# Patient Record
Sex: FEMALE | Race: BLACK OR AFRICAN AMERICAN | NOT HISPANIC OR LATINO | Employment: PART TIME | URBAN - METROPOLITAN AREA
[De-identification: names, ages, dates, MRNs, and addresses within clinical notes are randomized per-mention and may not be internally consistent; named-entity substitution may affect disease eponyms.]

---
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
## 2019-06-20 ENCOUNTER — OFFICE VISIT (OUTPATIENT)
Dept: URGENT CARE | Age: 19
End: 2019-06-20
Payer: COMMERCIAL

## 2019-06-20 VITALS
RESPIRATION RATE: 20 BRPM | DIASTOLIC BLOOD PRESSURE: 71 MMHG | HEART RATE: 62 BPM | OXYGEN SATURATION: 97 % | TEMPERATURE: 98.1 F | HEIGHT: 66 IN | SYSTOLIC BLOOD PRESSURE: 122 MMHG | WEIGHT: 156.2 LBS | BODY MASS INDEX: 25.1 KG/M2

## 2019-06-20 DIAGNOSIS — L30.9 DERMATITIS: Primary | ICD-10-CM

## 2019-06-20 PROCEDURE — G0382 LEV 3 HOSP TYPE B ED VISIT: HCPCS | Performed by: FAMILY MEDICINE

## 2019-06-20 PROCEDURE — 99283 EMERGENCY DEPT VISIT LOW MDM: CPT | Performed by: FAMILY MEDICINE

## 2019-06-20 PROCEDURE — 99203 OFFICE O/P NEW LOW 30 MIN: CPT | Performed by: FAMILY MEDICINE

## 2019-06-25 ENCOUNTER — OFFICE VISIT (OUTPATIENT)
Dept: FAMILY MEDICINE CLINIC | Facility: CLINIC | Age: 19
End: 2019-06-25
Payer: COMMERCIAL

## 2019-06-25 VITALS
BODY MASS INDEX: 24.94 KG/M2 | DIASTOLIC BLOOD PRESSURE: 70 MMHG | RESPIRATION RATE: 16 BRPM | HEIGHT: 66 IN | SYSTOLIC BLOOD PRESSURE: 120 MMHG | WEIGHT: 155.2 LBS | TEMPERATURE: 97.9 F | OXYGEN SATURATION: 97 % | HEART RATE: 76 BPM

## 2019-06-25 DIAGNOSIS — L42 PITYRIASIS ROSEA: Primary | ICD-10-CM

## 2019-06-25 PROBLEM — O35.8XX0 FETAL CARDIAC ANOMALY AFFECTING PREGNANCY, ANTEPARTUM: Status: RESOLVED | Noted: 2018-06-14 | Resolved: 2019-06-25

## 2019-06-25 PROBLEM — N92.0 MENORRHAGIA WITH REGULAR CYCLE: Status: RESOLVED | Noted: 2018-08-06 | Resolved: 2019-06-25

## 2019-06-25 PROBLEM — O99.011 ANEMIA AFFECTING PREGNANCY IN FIRST TRIMESTER: Status: RESOLVED | Noted: 2018-03-19 | Resolved: 2019-06-25

## 2019-06-25 PROBLEM — Z3A.39 39 WEEKS GESTATION OF PREGNANCY: Status: RESOLVED | Noted: 2018-10-02 | Resolved: 2019-06-25

## 2019-06-25 PROCEDURE — 3008F BODY MASS INDEX DOCD: CPT | Performed by: PHYSICIAN ASSISTANT

## 2019-06-25 PROCEDURE — 1036F TOBACCO NON-USER: CPT | Performed by: PHYSICIAN ASSISTANT

## 2019-06-25 PROCEDURE — 99203 OFFICE O/P NEW LOW 30 MIN: CPT | Performed by: PHYSICIAN ASSISTANT

## 2020-02-06 ENCOUNTER — TELEPHONE (OUTPATIENT)
Dept: FAMILY MEDICINE CLINIC | Facility: CLINIC | Age: 20
End: 2020-02-06

## 2020-02-06 NOTE — TELEPHONE ENCOUNTER
----- Message from Bienvenido Harris sent at 2/6/2020  9:55 AM EST -----  Regarding: schedule pt  Please call and schedule PE

## 2020-02-06 NOTE — TELEPHONE ENCOUNTER
----- Message from Knomo sent at 2/6/2020  9:55 AM EST -----  Regarding: schedule pt  Please call and schedule PE

## 2020-02-06 NOTE — TELEPHONE ENCOUNTER
----- Message from Megan Mota sent at 2/6/2020  9:55 AM EST -----  Regarding: schedule pt  Please call and schedule PE

## 2020-02-06 NOTE — TELEPHONE ENCOUNTER
Pt had a physical scheduled in Sept but was a no show   Called and left message to cb to schedule physical

## 2020-09-23 ENCOUNTER — TELEPHONE (OUTPATIENT)
Dept: FAMILY MEDICINE CLINIC | Facility: CLINIC | Age: 20
End: 2020-09-23

## 2021-02-15 ENCOUNTER — LAB REQUISITION (OUTPATIENT)
Dept: LAB | Facility: HOSPITAL | Age: 21
End: 2021-02-15

## 2021-02-15 DIAGNOSIS — Z11.59 ENCOUNTER FOR SCREENING FOR OTHER VIRAL DISEASES: ICD-10-CM

## 2021-02-15 PROCEDURE — 87635 SARS-COV-2 COVID-19 AMP PRB: CPT | Performed by: FAMILY MEDICINE

## 2021-02-16 LAB — SARS-COV-2 RNA RESP QL NAA+PROBE: NEGATIVE

## 2021-03-01 ENCOUNTER — TELEPHONE (OUTPATIENT)
Dept: FAMILY MEDICINE CLINIC | Facility: CLINIC | Age: 21
End: 2021-03-01

## 2021-06-22 ENCOUNTER — HOSPITAL ENCOUNTER (EMERGENCY)
Facility: HOSPITAL | Age: 21
Discharge: HOME | End: 2021-06-23
Attending: EMERGENCY MEDICINE
Payer: COMMERCIAL

## 2021-06-22 DIAGNOSIS — E87.1 HYPONATREMIA: ICD-10-CM

## 2021-06-22 DIAGNOSIS — R51.9 HEADACHE: Primary | ICD-10-CM

## 2021-06-22 LAB
BASOPHILS # BLD AUTO: 0.02 THOUSANDS/ΜL (ref 0–0.1)
BASOPHILS NFR BLD AUTO: 0 % (ref 0–1)
BILIRUB UR QL STRIP: NEGATIVE
CLARITY UR: ABNORMAL
COLOR UR: YELLOW
EOSINOPHIL # BLD AUTO: 0.05 THOUSAND/ΜL (ref 0–0.61)
EOSINOPHIL NFR BLD AUTO: 1 % (ref 0–6)
ERYTHROCYTE [DISTWIDTH] IN BLOOD BY AUTOMATED COUNT: 12.7 % (ref 11.6–15.1)
EXT PREG TEST URINE: NEGATIVE
EXT. CONTROL ED NAV: NORMAL
GLUCOSE UR STRIP-MCNC: NEGATIVE MG/DL
HCT VFR BLD AUTO: 38 % (ref 34.8–46.1)
HGB BLD-MCNC: 12.5 G/DL (ref 11.5–15.4)
HGB UR QL STRIP.AUTO: ABNORMAL
IMM GRANULOCYTES # BLD AUTO: 0.02 THOUSAND/UL (ref 0–0.2)
IMM GRANULOCYTES NFR BLD AUTO: 0 % (ref 0–2)
KETONES UR STRIP-MCNC: NEGATIVE MG/DL
LEUKOCYTE ESTERASE UR QL STRIP: ABNORMAL
LYMPHOCYTES # BLD AUTO: 3.11 THOUSANDS/ΜL (ref 0.6–4.47)
LYMPHOCYTES NFR BLD AUTO: 53 % (ref 14–44)
MCH RBC QN AUTO: 29.8 PG (ref 26.8–34.3)
MCHC RBC AUTO-ENTMCNC: 32.9 G/DL (ref 31.4–37.4)
MCV RBC AUTO: 91 FL (ref 82–98)
MONOCYTES # BLD AUTO: 0.5 THOUSAND/ΜL (ref 0.17–1.22)
MONOCYTES NFR BLD AUTO: 8 % (ref 4–12)
NEUTROPHILS # BLD AUTO: 2.22 THOUSANDS/ΜL (ref 1.85–7.62)
NEUTS SEG NFR BLD AUTO: 38 % (ref 43–75)
NITRITE UR QL STRIP: NEGATIVE
NRBC BLD AUTO-RTO: 0 /100 WBCS
PH UR STRIP.AUTO: 7.5 [PH] (ref 4.5–8)
PLATELET # BLD AUTO: 204 THOUSANDS/UL (ref 149–390)
PMV BLD AUTO: 9.2 FL (ref 8.9–12.7)
PROT UR STRIP-MCNC: NEGATIVE MG/DL
RBC # BLD AUTO: 4.2 MILLION/UL (ref 3.81–5.12)
SP GR UR STRIP.AUTO: 1.02 (ref 1–1.03)
UROBILINOGEN UR QL STRIP.AUTO: 2 E.U./DL
WBC # BLD AUTO: 5.92 THOUSAND/UL (ref 4.31–10.16)

## 2021-06-22 PROCEDURE — 99284 EMERGENCY DEPT VISIT MOD MDM: CPT | Performed by: EMERGENCY MEDICINE

## 2021-06-22 PROCEDURE — 80048 BASIC METABOLIC PNL TOTAL CA: CPT | Performed by: EMERGENCY MEDICINE

## 2021-06-22 PROCEDURE — 80076 HEPATIC FUNCTION PANEL: CPT | Performed by: EMERGENCY MEDICINE

## 2021-06-22 PROCEDURE — 36415 COLL VENOUS BLD VENIPUNCTURE: CPT | Performed by: EMERGENCY MEDICINE

## 2021-06-22 PROCEDURE — 93005 ELECTROCARDIOGRAM TRACING: CPT

## 2021-06-22 PROCEDURE — 83735 ASSAY OF MAGNESIUM: CPT | Performed by: EMERGENCY MEDICINE

## 2021-06-22 PROCEDURE — 96361 HYDRATE IV INFUSION ADD-ON: CPT

## 2021-06-22 PROCEDURE — 81025 URINE PREGNANCY TEST: CPT | Performed by: EMERGENCY MEDICINE

## 2021-06-22 PROCEDURE — 96375 TX/PRO/DX INJ NEW DRUG ADDON: CPT

## 2021-06-22 PROCEDURE — 85025 COMPLETE CBC W/AUTO DIFF WBC: CPT | Performed by: EMERGENCY MEDICINE

## 2021-06-22 PROCEDURE — 96374 THER/PROPH/DIAG INJ IV PUSH: CPT

## 2021-06-22 PROCEDURE — 99284 EMERGENCY DEPT VISIT MOD MDM: CPT

## 2021-06-22 PROCEDURE — 84443 ASSAY THYROID STIM HORMONE: CPT | Performed by: EMERGENCY MEDICINE

## 2021-06-22 PROCEDURE — 81001 URINALYSIS AUTO W/SCOPE: CPT

## 2021-06-22 RX ORDER — DIPHENHYDRAMINE HYDROCHLORIDE 50 MG/ML
25 INJECTION INTRAMUSCULAR; INTRAVENOUS ONCE
Status: DISCONTINUED | OUTPATIENT
Start: 2021-06-22 | End: 2021-06-23 | Stop reason: HOSPADM

## 2021-06-22 RX ORDER — METOCLOPRAMIDE HYDROCHLORIDE 5 MG/ML
10 INJECTION INTRAMUSCULAR; INTRAVENOUS ONCE
Status: COMPLETED | OUTPATIENT
Start: 2021-06-22 | End: 2021-06-22

## 2021-06-22 RX ORDER — KETOROLAC TROMETHAMINE 30 MG/ML
15 INJECTION, SOLUTION INTRAMUSCULAR; INTRAVENOUS ONCE
Status: COMPLETED | OUTPATIENT
Start: 2021-06-22 | End: 2021-06-22

## 2021-06-22 RX ADMIN — SODIUM CHLORIDE 1000 ML: 0.9 INJECTION, SOLUTION INTRAVENOUS at 23:21

## 2021-06-22 RX ADMIN — METOCLOPRAMIDE 10 MG: 5 INJECTION, SOLUTION INTRAMUSCULAR; INTRAVENOUS at 23:24

## 2021-06-22 RX ADMIN — KETOROLAC TROMETHAMINE 15 MG: 30 INJECTION, SOLUTION INTRAMUSCULAR; INTRAVENOUS at 23:24

## 2021-06-23 VITALS
OXYGEN SATURATION: 100 % | RESPIRATION RATE: 16 BRPM | DIASTOLIC BLOOD PRESSURE: 72 MMHG | TEMPERATURE: 98.1 F | SYSTOLIC BLOOD PRESSURE: 110 MMHG | HEART RATE: 76 BPM

## 2021-06-23 LAB
ALBUMIN SERPL BCP-MCNC: 4 G/DL (ref 3.5–5)
ALP SERPL-CCNC: 52 U/L (ref 46–116)
ALT SERPL W P-5'-P-CCNC: 19 U/L (ref 12–78)
AMORPH URATE CRY URNS QL MICRO: ABNORMAL /HPF
ANION GAP SERPL CALCULATED.3IONS-SCNC: -1 MMOL/L (ref 4–13)
AST SERPL W P-5'-P-CCNC: 18 U/L (ref 5–45)
ATRIAL RATE: 67 BPM
BACTERIA UR QL AUTO: ABNORMAL /HPF
BILIRUB DIRECT SERPL-MCNC: 0.08 MG/DL (ref 0–0.2)
BILIRUB SERPL-MCNC: 0.21 MG/DL (ref 0.2–1)
BUN SERPL-MCNC: 18 MG/DL (ref 5–25)
CALCIUM SERPL-MCNC: 9.4 MG/DL (ref 8.3–10.1)
CHLORIDE SERPL-SCNC: 101 MMOL/L (ref 100–108)
CO2 SERPL-SCNC: 26 MMOL/L (ref 21–32)
CREAT SERPL-MCNC: 0.74 MG/DL (ref 0.6–1.3)
GFR SERPL CREATININE-BSD FRML MDRD: 134 ML/MIN/1.73SQ M
GLUCOSE SERPL-MCNC: 88 MG/DL (ref 65–140)
MAGNESIUM SERPL-MCNC: 2 MG/DL (ref 1.6–2.6)
NON-SQ EPI CELLS URNS QL MICRO: ABNORMAL /HPF
P AXIS: 61 DEGREES
POTASSIUM SERPL-SCNC: 3.4 MMOL/L (ref 3.5–5.3)
PR INTERVAL: 148 MS
PROT SERPL-MCNC: 8 G/DL (ref 6.4–8.2)
QRS AXIS: 78 DEGREES
QRSD INTERVAL: 96 MS
QT INTERVAL: 380 MS
QTC INTERVAL: 401 MS
RBC #/AREA URNS AUTO: ABNORMAL /HPF
SODIUM SERPL-SCNC: 126 MMOL/L (ref 136–145)
T WAVE AXIS: 54 DEGREES
TSH SERPL DL<=0.05 MIU/L-ACNC: 3.62 UIU/ML (ref 0.36–3.74)
VENTRICULAR RATE: 67 BPM
WBC #/AREA URNS AUTO: ABNORMAL /HPF

## 2021-06-23 PROCEDURE — 93010 ELECTROCARDIOGRAM REPORT: CPT

## 2021-06-23 RX ORDER — SODIUM CHLORIDE 1000 MG
1 TABLET, SOLUBLE MISCELLANEOUS 3 TIMES DAILY
Qty: 30 TABLET | Refills: 0 | Status: SHIPPED | OUTPATIENT
Start: 2021-06-23 | End: 2021-07-03

## 2021-06-23 RX ORDER — METOCLOPRAMIDE 10 MG/1
10 TABLET ORAL EVERY 6 HOURS
Qty: 30 TABLET | Refills: 0 | Status: SHIPPED | OUTPATIENT
Start: 2021-06-23

## 2021-06-23 RX ORDER — NAPROXEN 500 MG/1
500 TABLET ORAL 2 TIMES DAILY WITH MEALS
Qty: 20 TABLET | Refills: 0 | Status: SHIPPED | OUTPATIENT
Start: 2021-06-23 | End: 2021-07-03

## 2021-06-23 NOTE — ED PROVIDER NOTES
History  Chief Complaint   Patient presents with    Headache     Pt reports headache for one week and bp of 168/120 at home per pt       22 YO female presents with headache for the last week  Pt states her discomfort is in the frontal head, constant over the last week, waxing and waning, it becomes sharp with bright lights, loud noises  Pt has had associated nausea for the last few days  She does not recall how the pain started  Pt has been taking ASA, NSAIDs without alleviation of the pain  Pt does have a Hx of infrequent headaches, states location and dull quality of pain are similar but her previous headaches did not last as long and would not become sharp  She had her blood pressure taken today and found this to be 168/120  Pt denies CP/SOB/F/C/D/C, no dysuria, burning on urination or blood in urine  History provided by:  Patient   used: No    Headache  Pain location:  Frontal  Quality:  Dull  Radiates to:  Does not radiate  Severity currently:  7/10  Severity at highest:  7/10  Onset quality:  Gradual  Duration:  1 week  Timing:  Constant  Progression:  Waxing and waning  Chronicity:  New  Similar to prior headaches: yes    Context: bright light    Relieved by:  Nothing  Worsened by:  Light and sound  Ineffective treatments:  Aspirin and NSAIDs  Associated symptoms: nausea    Associated symptoms: no abdominal pain, no diarrhea, no dizziness, no fatigue, no fever, no vomiting and no weakness        Prior to Admission Medications   Prescriptions Last Dose Informant Patient Reported? Taking?   hydrocortisone 2 5 % cream   No No   Sig: Apply topically 3 (three) times a day      Facility-Administered Medications: None       Past Medical History:   Diagnosis Date    Anemia affecting pregnancy in first trimester 3/19/2018    Varicella     Pt is unsure        History reviewed  No pertinent surgical history      Family History   Problem Relation Age of Onset    Hypertension Mother    Nicki Snell Asthma Brother     Hypertension Maternal Grandmother     Alcohol abuse Maternal Grandfather     Hypertension Maternal Aunt      I have reviewed and agree with the history as documented  E-Cigarette/Vaping     E-Cigarette/Vaping Substances     Social History     Tobacco Use    Smoking status: Never Smoker    Smokeless tobacco: Never Used   Substance Use Topics    Alcohol use: No    Drug use: No       Review of Systems   Constitutional: Negative for chills, fatigue and fever  HENT: Negative for dental problem  Eyes: Negative for visual disturbance  Respiratory: Negative for shortness of breath  Cardiovascular: Negative for chest pain  Gastrointestinal: Positive for nausea  Negative for abdominal pain, diarrhea and vomiting  Genitourinary: Negative for dysuria and frequency  Musculoskeletal: Negative for arthralgias  Skin: Negative for rash  Neurological: Positive for headaches  Negative for dizziness, weakness and light-headedness  Psychiatric/Behavioral: Negative for agitation, behavioral problems and confusion  All other systems reviewed and are negative  Physical Exam  Physical Exam  Vitals and nursing note reviewed  Constitutional:       Appearance: Normal appearance  HENT:      Head: Normocephalic and atraumatic  Eyes:      Extraocular Movements: Extraocular movements intact  Conjunctiva/sclera: Conjunctivae normal       Pupils: Pupils are equal, round, and reactive to light  Cardiovascular:      Rate and Rhythm: Normal rate  Pulmonary:      Effort: Pulmonary effort is normal    Abdominal:      General: There is no distension  Musculoskeletal:         General: Normal range of motion  Cervical back: Normal range of motion and neck supple  No rigidity  Skin:     Findings: No rash  Neurological:      General: No focal deficit present  Mental Status: She is alert  Cranial Nerves: No cranial nerve deficit     Psychiatric:         Mood and Affect: Mood normal          Vital Signs  ED Triage Vitals   Temperature Pulse Respirations Blood Pressure SpO2   06/22/21 2235 06/22/21 2235 06/22/21 2235 06/22/21 2235 06/22/21 2235   98 1 °F (36 7 °C) 77 16 164/91 100 %      Temp Source Heart Rate Source Patient Position - Orthostatic VS BP Location FiO2 (%)   06/22/21 2235 06/22/21 2235 06/23/21 0045 06/22/21 2235 --   Oral Monitor Sitting Right arm       Pain Score       06/22/21 2324       7           Vitals:    06/22/21 2235 06/23/21 0013 06/23/21 0045   BP: 164/91 106/72 110/72   Pulse: 77 73 76   Patient Position - Orthostatic VS:   Sitting         Visual Acuity      ED Medications  Medications   diphenhydrAMINE (BENADRYL) injection 25 mg (25 mg Intravenous Not Given 6/22/21 2328)   sodium chloride 0 9 % bolus 1,000 mL (0 mL Intravenous Stopped 6/23/21 0013)   ketorolac (TORADOL) injection 15 mg (15 mg Intravenous Given 6/22/21 2324)   metoclopramide (REGLAN) injection 10 mg (10 mg Intravenous Given 6/22/21 2324)       Diagnostic Studies  Results Reviewed     Procedure Component Value Units Date/Time    Basic metabolic panel [[id number]]  (Abnormal) Collected: 06/22/21 2323    Lab Status: Final result Specimen: Blood from Arm, Right Updated: 06/23/21 0039     Sodium 126 mmol/L      Potassium 3 4 mmol/L      Chloride 101 mmol/L      CO2 26 mmol/L      ANION GAP -1 mmol/L      BUN 18 mg/dL      Creatinine 0 74 mg/dL      Glucose 88 mg/dL      Calcium 9 4 mg/dL      eGFR 134 ml/min/1 73sq m     Narrative:      Misty guidelines for Chronic Kidney Disease (CKD):     Stage 1 with normal or high GFR (GFR > 90 mL/min/1 73 square meters)    Stage 2 Mild CKD (GFR = 60-89 mL/min/1 73 square meters)    Stage 3A Moderate CKD (GFR = 45-59 mL/min/1 73 square meters)    Stage 3B Moderate CKD (GFR = 30-44 mL/min/1 73 square meters)    Stage 4 Severe CKD (GFR = 15-29 mL/min/1 73 square meters)    Stage 5 End Stage CKD (GFR <15 mL/min/1 73 square meters)  Note: GFR calculation is accurate only with a steady state creatinine    Hepatic function panel [[id number]]  (Normal) Collected: 06/22/21 2323    Lab Status: Final result Specimen: Blood from Arm, Right Updated: 06/23/21 0039     Total Bilirubin 0 21 mg/dL      Bilirubin, Direct 0 08 mg/dL      Alkaline Phosphatase 52 U/L      AST 18 U/L      ALT 19 U/L      Total Protein 8 0 g/dL      Albumin 4 0 g/dL     TSH [[id number]]  (Normal) Collected: 06/22/21 2323    Lab Status: Final result Specimen: Blood from Arm, Right Updated: 06/23/21 0039     TSH 3RD GENERATON 3 622 uIU/mL     Narrative:      Patients undergoing fluorescein dye angiography may retain small amounts of fluorescein in the body for 48-72 hours post procedure  Samples containing fluorescein can produce falsely depressed TSH values  If the patient had this procedure,a specimen should be resubmitted post fluorescein clearance        Magnesium [[id number]]  (Normal) Collected: 06/22/21 2323    Lab Status: Final result Specimen: Blood from Arm, Right Updated: 06/23/21 0039     Magnesium 2 0 mg/dL     Urine Microscopic [[id number]]  (Abnormal) Collected: 06/22/21 2311    Lab Status: Final result Specimen: Urine, Clean Catch Updated: 06/23/21 0027     RBC, UA 2-4 /hpf      WBC, UA 1-2 /hpf      Epithelial Cells Occasional /hpf      Bacteria, UA Occasional /hpf      AMORPH URATES Moderate /hpf     CBC and differential [[id number]]  (Abnormal) Collected: 06/22/21 2323    Lab Status: Final result Specimen: Blood from Arm, Right Updated: 06/22/21 2352     WBC 5 92 Thousand/uL      RBC 4 20 Million/uL      Hemoglobin 12 5 g/dL      Hematocrit 38 0 %      MCV 91 fL      MCH 29 8 pg      MCHC 32 9 g/dL      RDW 12 7 %      MPV 9 2 fL      Platelets 245 Thousands/uL      nRBC 0 /100 WBCs      Neutrophils Relative 38 %      Immat GRANS % 0 %      Lymphocytes Relative 53 %      Monocytes Relative 8 %      Eosinophils Relative 1 %      Basophils Relative 0 % Neutrophils Absolute 2 22 Thousands/µL      Immature Grans Absolute 0 02 Thousand/uL      Lymphocytes Absolute 3 11 Thousands/µL      Monocytes Absolute 0 50 Thousand/µL      Eosinophils Absolute 0 05 Thousand/µL      Basophils Absolute 0 02 Thousands/µL     POCT pregnancy, urine [[id number]]  (Normal) Resulted: 06/22/21 2314    Lab Status: Final result Updated: 06/22/21 2314     EXT PREG TEST UR (Ref: Negative) negative     Control valid    Urine Macroscopic, POC [[id number]]  (Abnormal) Collected: 06/22/21 2311    Lab Status: Final result Specimen: Urine Updated: 06/22/21 2313     Color, UA Yellow     Clarity, UA Cloudy     pH, UA 7 5     Leukocytes, UA Small     Nitrite, UA Negative     Protein, UA Negative mg/dl      Glucose, UA Negative mg/dl      Ketones, UA Negative mg/dl      Urobilinogen, UA 2 0 E U /dl      Bilirubin, UA Negative     Blood, UA Large     Specific Gravity, UA 1 025    Narrative:      CLINITEK RESULT                 No orders to display              Procedures  ECG 12 Lead Documentation Only    Date/Time: 6/22/2021 11:17 PM  Performed by: Fannie Caban MD  Authorized by: Fannie Caban MD     ECG reviewed by me, the ED Provider: yes    Patient location:  ED  Interpretation:     Interpretation: normal    Rate:     ECG rate:  71    ECG rate assessment: normal    Rhythm:     Rhythm: sinus rhythm    QRS:     QRS axis:  Normal    QRS intervals:  Normal  Conduction:     Conduction: normal    ST segments:     ST segments:  Normal  T waves:     T waves: normal               ED Course  ED Course as of Jun 23 0104   Wed Jun 23, 2021   0040 Pt has no complaints at this time  SBIRT 20yo+      Most Recent Value   SBIRT (22 yo +)   In order to provide better care to our patients, we are screening all of our patients for alcohol and drug use  Would it be okay to ask you these screening questions?   No Filed at: 06/22/2021 2339                    [facility]  Number of Diagnoses or Management Options  Headache: new and requires workup  Hyponatremia: new and requires workup  Diagnosis management comments: 1  Headache - Pt states this has been present for the last week, constant  She notes it is similar in quality and location to previous headaches, though more intense and her usual, infrequent headaches are not associated with intermittent, sharp pain  She did have an elevated blood pressure tonight, continues to be elevated  Will check ECG for signs of strain, metabolic panel for electrolyte abnormalities and dehydration, CBC, TSH  Will give fluids, Reglan, Toradol, Benadryl for headache, reassess  Amount and/or Complexity of Data Reviewed  Clinical lab tests: ordered and reviewed  Review and summarize past medical records: yes  Independent visualization of images, tracings, or specimens: yes    Patient Progress  Patient progress: improved      Disposition  Final diagnoses:   Headache   Hyponatremia     Time reflects when diagnosis was documented in both MDM as applicable and the Disposition within this note     Time User Action Codes Description Comment    6/23/2021 12:44 AM Caitlin Burton [R51 9] Headache     6/23/2021 12:44 AM Caitlin Burton [E87 1] Hyponatremia       ED Disposition     ED Disposition Condition Date/Time Comment    Discharge Stable Wed Jun 23, 2021 12:46 AM Bee Shrutigel discharge to home/self care  Follow-up Information     Follow up With Specialties Details Why Contact Lito Dawn MD Family Medicine Schedule an appointment as soon as possible for a visit    S   [facility] [phone]  [phone]            Discharge Medication List as of 6/23/2021 12:46 AM      START taking these medications    Details   metoclopramide (REGLAN) 10 mg tablet Take 1 tablet (10 mg total) by mouth every 6 (six) hours, Starting Wed 6/23/2021, Normal      naproxen (NAPROSYN) 500 mg tablet Take 1 tablet (500 mg total) by mouth 2 (two) times a day with meals for 10 days, Starting Wed 6/23/2021, Until Sat 7/3/2021, Normal      sodium chloride 1 g tablet Take 1 tablet (1 g total) by mouth 3 (three) times a day for 10 days, Starting Wed 6/23/2021, Until Sat 7/3/2021, Normal         CONTINUE these medications which have NOT CHANGED    Details   hydrocortisone 2 5 % cream Apply topically 3 (three) times a day, Starting Thu 6/20/2019, Normal           No discharge procedures on file      PDMP Review     None          ED Provider  Electronically Signed by           Mason Johnson MD  06/23/21 2339

## 2021-06-23 NOTE — DISCHARGE INSTRUCTIONS
Take the Naprosyn twice daily for the next 5-10 days  You can take the Reglan as needed if your headache worsens  Take the salt tablets 3 times daily for the next 10 days  The number for your family doctor is included in these discharge instructions, call to be seen in the office for further evaluation and management